# Patient Record
Sex: MALE | Race: WHITE | NOT HISPANIC OR LATINO | Employment: OTHER | URBAN - METROPOLITAN AREA
[De-identification: names, ages, dates, MRNs, and addresses within clinical notes are randomized per-mention and may not be internally consistent; named-entity substitution may affect disease eponyms.]

---

## 2018-06-27 ENCOUNTER — NURSE TRIAGE (OUTPATIENT)
Dept: ADMINISTRATIVE | Facility: CLINIC | Age: 68
End: 2018-06-27

## 2018-06-27 NOTE — TELEPHONE ENCOUNTER
Reason for Disposition   Information only question and nurse able to answer    Protocols used: ST NO PROTOCOL CALL: INFORMATION ONLY-A-[place]  wife called asking about donating a kidney to a friend. Advised that she would need to speak to the transplant center during regular business hours for more information

## 2018-10-17 ENCOUNTER — TELEPHONE (OUTPATIENT)
Dept: TRANSPLANT | Facility: CLINIC | Age: 68
End: 2018-10-17

## 2018-10-17 NOTE — TELEPHONE ENCOUNTER
Minesh Eisenberg called and stated that he is interested in becoming a living donor for his friend, George Oh.  Medical and social history obtained.  No contraindications noted.  All questions answered.  Education book emailed to patient. Instructed to contact me with any questions or if he would like to be tested. Informed that we will not begin testing until the recipient has been approved. Patient verbalized understanding.

## 2019-01-29 ENCOUNTER — TELEPHONE (OUTPATIENT)
Dept: TRANSPLANT | Facility: CLINIC | Age: 69
End: 2019-01-29

## 2019-01-29 DIAGNOSIS — Z00.5 TRANSPLANT DONOR EVALUATION: Primary | ICD-10-CM

## 2019-01-29 NOTE — TELEPHONE ENCOUNTER
Spoke with patient's wife, patient would like to be tested as a donor for his friend. Patient will be in [place] in a few weeks. Crossmatch and 24 hour urine collection scheduled 2/25/19.   unsure

## 2019-02-25 ENCOUNTER — LAB VISIT (OUTPATIENT)
Dept: LAB | Facility: HOSPITAL | Age: 69
End: 2019-02-25
Attending: FAMILY MEDICINE
Payer: MEDICARE

## 2019-02-25 DIAGNOSIS — Z00.5 TRANSPLANT DONOR EVALUATION: ICD-10-CM

## 2019-02-25 LAB
A1 AG RBC QL: NORMAL
ABO + RH BLD: NORMAL
CREAT SERPL-MCNC: 0.8 MG/DL
EST. GFR  (AFRICAN AMERICAN): >60 ML/MIN/1.73 M^2
EST. GFR  (NON AFRICAN AMERICAN): >60 ML/MIN/1.73 M^2

## 2019-02-25 PROCEDURE — 86905 BLOOD TYPING RBC ANTIGENS: CPT | Mod: TXP

## 2019-02-25 PROCEDURE — 81373 HLA I TYPING 1 LOCUS LR: CPT | Mod: 91,TXP

## 2019-02-25 PROCEDURE — 82565 ASSAY OF CREATININE: CPT | Mod: TXP

## 2019-02-25 PROCEDURE — 81376 HLA II TYPING 1 LOCUS LR: CPT | Mod: 91,TXP

## 2019-02-25 PROCEDURE — 81373 HLA I TYPING 1 LOCUS LR: CPT | Mod: TXP

## 2019-02-25 PROCEDURE — 36415 COLL VENOUS BLD VENIPUNCTURE: CPT | Mod: PO,TXP

## 2019-02-25 PROCEDURE — 81376 HLA II TYPING 1 LOCUS LR: CPT | Mod: TXP

## 2019-02-25 PROCEDURE — 86901 BLOOD TYPING SEROLOGIC RH(D): CPT | Mod: TXP

## 2019-03-04 ENCOUNTER — TELEPHONE (OUTPATIENT)
Dept: TRANSPLANT | Facility: CLINIC | Age: 69
End: 2019-03-04

## 2019-03-04 LAB — HLATY INTERPRETATION: NORMAL

## 2019-03-04 NOTE — TELEPHONE ENCOUNTER
Patient calling for test results. Results of 24 hour urine collection reviewed with Dr. Haile. Crcl acceptable and no additional testing needed.     Patient and wife notified that the results of the crossmatch with Mr. Oh show that they are compatible and kidney function is acceptable. Patient states he would like to proceed with the medical evaluation. Required testing discussed. [employer] application emailed to patient to complete. He will have the results of his recent colonoscopy and dermatology appointments sent to me. Will schedule testing once [employer] application completed. All questions were answered and patient verbalized understanding.

## 2019-03-07 LAB
HLA DRB4 1: NORMAL
HLA SSO DNA TYPING CLASS I & II INTERPRETATION: NORMAL
HLA-A 1 SERO. EQUIV: 2
HLA-A 1: NORMAL
HLA-A 2 SERO. EQUIV: 24
HLA-A 2: NORMAL
HLA-B 1 SERO. EQUIV: 35
HLA-B 1: NORMAL
HLA-B 2 SERO. EQUIV: 44
HLA-B 2: NORMAL
HLA-BW 1 SERO. EQUIV: 6
HLA-BW 2 SERO. EQUIV: 4
HLA-C 1: NORMAL
HLA-C 2: NORMAL
HLA-CW 1 SERO. EQUIV: 4
HLA-CW 2 SERO. EQUIV: 5
HLA-DQ 1 SERO. EQUIV: 7
HLA-DQ 2 SERO. EQUIV: 8
HLA-DQB1 1: NORMAL
HLA-DQB1 2: NORMAL
HLA-DRB1 1 SERO. EQUIV: 4
HLA-DRB1 1: NORMAL
HLA-DRB1 2 SERO. EQUIV: 11
HLA-DRB1 2: NORMAL
HLA-DRB3 1: NORMAL
HLA-DRB3 2: NORMAL
HLA-DRB345 1 SERO. EQUIV: 52
HLA-DRB345 2 SERO. EQUIV: 53
HLA-DRB4 2: NORMAL
HLA-DRB5 1: NORMAL
HLA-DRB5 2: NORMAL
SSDQB TESTING DATE: NORMAL
SSDRB TESTING DATE: NORMAL
SSOA TESTING DATE: NORMAL
SSOB TESTING DATE: NORMAL
SSOC TESTING DATE: NORMAL
SSODR TESTING DATE: NORMAL
TYSSO TESTING DATE: NORMAL

## 2019-03-21 ENCOUNTER — TELEPHONE (OUTPATIENT)
Dept: TRANSPLANT | Facility: CLINIC | Age: 69
End: 2019-03-21

## 2019-03-21 DIAGNOSIS — Z00.5 TRANSPLANT DONOR EVALUATION: Primary | ICD-10-CM

## 2019-03-21 NOTE — TELEPHONE ENCOUNTER
Patient's wife called, states she will be mailing completed [employer] applications to us. Address provided.   States they would like to come in the summer to complete the medical evaluation. Required testing discussed and information provided to schedule appointments. All questions were answered and patient's wife verbalized understanding.

## 2019-04-15 ENCOUNTER — TELEPHONE (OUTPATIENT)
Dept: TRANSPLANT | Facility: CLINIC | Age: 69
End: 2019-04-15

## 2019-04-15 NOTE — TELEPHONE ENCOUNTER
Patient's wife notified that NLDA application received today and will be given to the social workers to submit. Informed that it usually takes a few weeks to hear if approved after submission. Patient will update me regarding approval. All questions were answered.    ----- Message from Samanta Abreu sent at 4/15/2019  1:27 PM CDT -----  Contact: Sanam bradley's wife  Needs Advice    Reason for call: Calling to speak with pt's coordinator    Would like an update on the financial help         Communication Preference: [phone] or [phone]    Additional Information: N/A

## 2019-04-16 ENCOUNTER — SOCIAL WORK (OUTPATIENT)
Dept: TRANSPLANT | Facility: HOSPITAL | Age: 69
End: 2019-04-16

## 2019-08-12 ENCOUNTER — HOSPITAL ENCOUNTER (OUTPATIENT)
Dept: RADIOLOGY | Facility: HOSPITAL | Age: 69
Discharge: HOME | End: 2019-08-12
Attending: NURSE PRACTITIONER
Payer: MEDICARE

## 2019-08-12 ENCOUNTER — HOSPITAL ENCOUNTER (OUTPATIENT)
Dept: CARDIOLOGY | Facility: CLINIC | Age: 69
Discharge: HOME | End: 2019-08-12
Attending: NURSE PRACTITIONER
Payer: MEDICARE

## 2019-08-12 VITALS — BODY MASS INDEX: 28.63 KG/M2 | HEIGHT: 70 IN | WEIGHT: 200 LBS

## 2019-08-12 DIAGNOSIS — Z00.5 TRANSPLANT DONOR EVALUATION: ICD-10-CM

## 2019-08-12 LAB
ASCENDING AORTA: 3.44 CM
BSA FOR ECHO PROCEDURE: 2.12 M2
CV ECHO LV RWT: 0.3 CM
CV STRESS BASE HR: 56 BPM
DIASTOLIC BLOOD PRESSURE: 88 MMHG
DOP CALC LVOT AREA: 4.2 CM2
DOP CALC LVOT DIAMETER: 2.32 CM
DOP CALC LVOT PEAK VEL: 0.91 M/S
DOP CALC LVOT STROKE VOLUME: 97.73 CM3
DOP CALCLVOT PEAK VEL VTI: 23.13 CM
E WAVE DECELERATION TIME: 392.18 MSEC
E/A RATIO: 0.52
E/E' RATIO: 9 M/S
ECHO LV POSTERIOR WALL: 0.78 CM (ref 0.6–1.1)
FRACTIONAL SHORTENING: 34 % (ref 28–44)
INTERVENTRICULAR SEPTUM: 0.84 CM (ref 0.6–1.1)
IVRT: 0.16 MSEC
LA MAJOR: 6.41 CM
LA MINOR: 5.87 CM
LA WIDTH: 4.37 CM
LEFT ATRIUM SIZE: 4.16 CM
LEFT ATRIUM VOLUME INDEX: 45.4 ML/M2
LEFT ATRIUM VOLUME: 94.69 CM3
LEFT INTERNAL DIMENSION IN SYSTOLE: 3.45 CM (ref 2.1–4)
LEFT VENTRICLE DIASTOLIC VOLUME INDEX: 62.4 ML/M2
LEFT VENTRICLE DIASTOLIC VOLUME: 130.25 ML
LEFT VENTRICLE MASS INDEX: 71 G/M2
LEFT VENTRICLE SYSTOLIC VOLUME INDEX: 23.5 ML/M2
LEFT VENTRICLE SYSTOLIC VOLUME: 49.07 ML
LEFT VENTRICULAR INTERNAL DIMENSION IN DIASTOLE: 5.21 CM (ref 3.5–6)
LEFT VENTRICULAR MASS: 148.02 G
LV LATERAL E/E' RATIO: 9 M/S
LV SEPTAL E/E' RATIO: 9 M/S
MV PEAK A VEL: 0.86 M/S
MV PEAK E VEL: 0.45 M/S
OHS CV CPX 1 MINUTE RECOVERY HEART RATE: 69 BPM
OHS CV CPX 85 PERCENT MAX PREDICTED HEART RATE MALE: 127
OHS CV CPX ESTIMATED METS: 11
OHS CV CPX MAX PREDICTED HEART RATE: 149
OHS CV CPX PATIENT IS FEMALE: 0
OHS CV CPX PATIENT IS MALE: 1
OHS CV CPX PEAK DIASTOLIC BLOOD PRESSURE: 66 MMHG
OHS CV CPX PEAK HEAR RATE: 101 BPM
OHS CV CPX PEAK RATE PRESSURE PRODUCT: NORMAL
OHS CV CPX PEAK SYSTOLIC BLOOD PRESSURE: 202 MMHG
OHS CV CPX PERCENT MAX PREDICTED HEART RATE ACHIEVED: 68
OHS CV CPX RATE PRESSURE PRODUCT PRESENTING: 8568
PISA TR MAX VEL: 1.75 M/S
PULM VEIN S/D RATIO: 1.41
PV PEAK D VEL: 0.34 M/S
PV PEAK S VEL: 0.48 M/S
RA MAJOR: 5.52 CM
RA WIDTH: 4.73 CM
RIGHT VENTRICULAR END-DIASTOLIC DIMENSION: 4.5 CM
RV TISSUE DOPPLER FREE WALL SYSTOLIC VELOCITY 1 (APICAL 4 CHAMBER VIEW): 11.94 CM/S
SINUS: 3.75 CM
STJ: 2.95 CM
STRESS ECHO POST EXERCISE DUR MIN: 6 MINUTES
STRESS ECHO POST EXERCISE DUR SEC: 42 SECONDS
SYSTOLIC BLOOD PRESSURE: 153 MMHG
TDI LATERAL: 0.05 M/S
TDI SEPTAL: 0.05 M/S
TDI: 0.05 M/S
TR MAX PG: 12 MMHG
TRICUSPID ANNULAR PLANE SYSTOLIC EXCURSION: 2.24 CM

## 2019-08-12 PROCEDURE — 71046 X-RAY EXAM CHEST 2 VIEWS: CPT | Mod: TC,FY,TXP

## 2019-08-12 PROCEDURE — 93351 STRESS TTE COMPLETE: CPT | Mod: PBBFAC,NTX | Performed by: INTERNAL MEDICINE

## 2019-08-12 PROCEDURE — 71046 X-RAY EXAM CHEST 2 VIEWS: CPT | Mod: 26,TXP,, | Performed by: RADIOLOGY

## 2019-08-12 PROCEDURE — 93351 ECHOCARDIOGRAM STRESS TEST (CUPID ONLY): ICD-10-PCS | Mod: S$GLB,,, | Performed by: INTERNAL MEDICINE

## 2019-08-12 PROCEDURE — 71046 XR CHEST PA AND LATERAL: ICD-10-PCS | Mod: 26,TXP,, | Performed by: RADIOLOGY

## 2019-08-13 ENCOUNTER — HOSPITAL ENCOUNTER (OUTPATIENT)
Dept: RADIOLOGY | Facility: HOSPITAL | Age: 69
Discharge: HOME | End: 2019-08-13
Attending: TRANSPLANT SURGERY
Payer: MEDICARE

## 2019-08-13 ENCOUNTER — OFFICE VISIT (OUTPATIENT)
Dept: PSYCHIATRY | Facility: CLINIC | Age: 69
End: 2019-08-13
Payer: MEDICARE

## 2019-08-13 ENCOUNTER — OFFICE VISIT (OUTPATIENT)
Dept: TRANSPLANT | Facility: CLINIC | Age: 69
End: 2019-08-13
Payer: MEDICARE

## 2019-08-13 VITALS
DIASTOLIC BLOOD PRESSURE: 84 MMHG | WEIGHT: 208.13 LBS | HEART RATE: 52 BPM | RESPIRATION RATE: 18 BRPM | BODY MASS INDEX: 30.83 KG/M2 | OXYGEN SATURATION: 96 % | SYSTOLIC BLOOD PRESSURE: 163 MMHG | TEMPERATURE: 98 F | HEIGHT: 69 IN

## 2019-08-13 DIAGNOSIS — Z00.5 TRANSPLANT DONOR EVALUATION: Primary | ICD-10-CM

## 2019-08-13 DIAGNOSIS — Z00.5 TRANSPLANT DONOR EVALUATION: ICD-10-CM

## 2019-08-13 DIAGNOSIS — Z87.09 HISTORY OF ASTHMA: ICD-10-CM

## 2019-08-13 DIAGNOSIS — Z01.818 PRE-OP EXAM: Primary | ICD-10-CM

## 2019-08-13 DIAGNOSIS — Z52.4 KIDNEY DONOR: ICD-10-CM

## 2019-08-13 DIAGNOSIS — R03.0 ELEVATED BLOOD PRESSURE READING: ICD-10-CM

## 2019-08-13 DIAGNOSIS — E78.49 OTHER HYPERLIPIDEMIA: ICD-10-CM

## 2019-08-13 DIAGNOSIS — K21.9 GASTROESOPHAGEAL REFLUX DISEASE WITHOUT ESOPHAGITIS: ICD-10-CM

## 2019-08-13 DIAGNOSIS — Z85.828 HISTORY OF SCC (SQUAMOUS CELL CARCINOMA) OF SKIN: ICD-10-CM

## 2019-08-13 PROCEDURE — 99999 PR PBB SHADOW E&M-EST. PATIENT-LVL III: ICD-10-PCS | Mod: PBBFAC,TXP,,

## 2019-08-13 PROCEDURE — 74177 CT ABD & PELVIS W/CONTRAST: CPT | Mod: 26,TXP,, | Performed by: RADIOLOGY

## 2019-08-13 PROCEDURE — 99205 PR OFFICE/OUTPT VISIT, NEW, LEVL V, 60-74 MIN: ICD-10-PCS | Mod: S$GLB,TXP,, | Performed by: NURSE PRACTITIONER

## 2019-08-13 PROCEDURE — 99244 OFF/OP CNSLTJ NEW/EST MOD 40: CPT | Mod: S$GLB,TXP,, | Performed by: SURGERY

## 2019-08-13 PROCEDURE — 97802 MEDICAL NUTRITION INDIV IN: CPT | Mod: S$GLB,TXP,, | Performed by: DIETITIAN, REGISTERED

## 2019-08-13 PROCEDURE — 74177 CT ABD & PELVIS W/CONTRAST: CPT | Mod: TC,TXP

## 2019-08-13 PROCEDURE — 99244 PR OFFICE CONSULTATION,LEVEL IV: ICD-10-PCS | Mod: S$GLB,TXP,, | Performed by: SURGERY

## 2019-08-13 PROCEDURE — 96130 PR PSYCHOLOGIC TEST EVAL SVCS, 1ST HR: ICD-10-PCS | Mod: S$GLB,ICN,, | Performed by: PSYCHOLOGIST

## 2019-08-13 PROCEDURE — 96131 PSYCL TST EVAL PHYS/QHP EA: CPT | Mod: S$GLB,ICN,, | Performed by: PSYCHOLOGIST

## 2019-08-13 PROCEDURE — 96131 PR PSYCHOLOGIC TEST EVAL SVCS, EA ADDTL HR: ICD-10-PCS | Mod: S$GLB,ICN,, | Performed by: PSYCHOLOGIST

## 2019-08-13 PROCEDURE — 96138 PSYCL/NRPSYC TECH 1ST: CPT | Mod: 59,S$GLB,ICN, | Performed by: PSYCHOLOGIST

## 2019-08-13 PROCEDURE — 96139 PSYCL/NRPSYC TST TECH EA: CPT | Mod: S$GLB,ICN,, | Performed by: PSYCHOLOGIST

## 2019-08-13 PROCEDURE — 99999 PR PBB SHADOW E&M-EST. PATIENT-LVL III: CPT | Mod: PBBFAC,TXP,,

## 2019-08-13 PROCEDURE — 90791 PR PSYCHIATRIC DIAGNOSTIC EVALUATION: ICD-10-PCS | Mod: S$GLB,ICN,, | Performed by: PSYCHOLOGIST

## 2019-08-13 PROCEDURE — 74177 CT ABDOMEN PELVIS WITH CONTRAST: ICD-10-PCS | Mod: 26,TXP,, | Performed by: RADIOLOGY

## 2019-08-13 PROCEDURE — 97802 PR MED NUTR THER, 1ST, INDIV, EA 15 MIN: ICD-10-PCS | Mod: S$GLB,TXP,, | Performed by: DIETITIAN, REGISTERED

## 2019-08-13 PROCEDURE — 96138 PR PSYCH/NEUROPSYCH TEST ADMIN/SCORING, BY TECH, 2+ TESTS, 1ST 30 MIN: ICD-10-PCS | Mod: 59,S$GLB,ICN, | Performed by: PSYCHOLOGIST

## 2019-08-13 PROCEDURE — 90791 PSYCH DIAGNOSTIC EVALUATION: CPT | Mod: S$GLB,ICN,, | Performed by: PSYCHOLOGIST

## 2019-08-13 PROCEDURE — 96130 PSYCL TST EVAL PHYS/QHP 1ST: CPT | Mod: S$GLB,ICN,, | Performed by: PSYCHOLOGIST

## 2019-08-13 PROCEDURE — 25500020 PHARM REV CODE 255: Mod: TXP | Performed by: TRANSPLANT SURGERY

## 2019-08-13 PROCEDURE — 99205 OFFICE O/P NEW HI 60 MIN: CPT | Mod: S$GLB,TXP,, | Performed by: NURSE PRACTITIONER

## 2019-08-13 PROCEDURE — 96139 PR PSYCH/NEUROPSYCH TEST ADMIN/SCORING, BY TECH, 2+ TESTS, EA ADDTL 30 MIN: ICD-10-PCS | Mod: S$GLB,ICN,, | Performed by: PSYCHOLOGIST

## 2019-08-13 RX ORDER — CYANOCOBALAMIN (VITAMIN B-12) 500 MCG
400 TABLET ORAL DAILY
COMMUNITY

## 2019-08-13 RX ORDER — OMEPRAZOLE 20 MG/1
20 CAPSULE, DELAYED RELEASE ORAL DAILY
COMMUNITY

## 2019-08-13 RX ORDER — TRAMADOL HYDROCHLORIDE 50 MG/1
50 TABLET ORAL EVERY 8 HOURS PRN
COMMUNITY

## 2019-08-13 RX ORDER — ASPIRIN 81 MG/1
81 TABLET ORAL DAILY
COMMUNITY

## 2019-08-13 RX ORDER — CELECOXIB 200 MG/1
200 CAPSULE ORAL DAILY
COMMUNITY

## 2019-08-13 RX ORDER — EZETIMIBE 10 MG/1
10 TABLET ORAL DAILY
COMMUNITY

## 2019-08-13 RX ADMIN — IOHEXOL 125 ML: 350 INJECTION, SOLUTION INTRAVENOUS at 05:08

## 2019-08-13 NOTE — PROGRESS NOTES
PHARM.D. PRE-TRANSPLANT DONOR NOTE:    This patient's medication therapy was evaluated as part of his pre-transplant evaluation.      The following general pharmacologic concerns were noted: aspirin (recommend to hold 5-7 days prior to surgery).  Patient also takes celebrex (NSAIDs) daily - discussed that we recommend to avoid NSAIDs post donation, patient and caregiver aware and may try alternatives (APAP) prior to donation.        Current Outpatient Medications   Medication Sig Dispense Refill    aspirin (ECOTRIN) 81 MG EC tablet Take 81 mg by mouth once daily.      celecoxib (CELEBREX) 200 MG capsule Take 200 mg by mouth once daily.      cholecalciferol, vitamin D3, (VITAMIN D3) 400 unit Tab Take 400 Units by mouth once daily.      ezetimibe (ZETIA) 10 mg tablet Take 10 mg by mouth once daily.      omeprazole (PRILOSEC) 20 MG capsule Take 20 mg by mouth once daily.      traMADol (ULTRAM) 50 mg tablet Take 50 mg by mouth every 8 (eight) hours as needed for Pain.       No current facility-administered medications for this visit.          Currently Mr Eisenberg is responsible for preparing / administering this patient's medications on a daily basis.  I am available for consultation and can be contacted, as needed by the other members of the Kidney Transplant team.

## 2019-08-13 NOTE — PROGRESS NOTES
REFERRING PROVIDER: Gabrielle De La Garza MD    REASON FOR VISIT: High triglyceride education, weight loss education for potential kidney donor    PAST MEDICAL HX: Hyperlipidemia    LABS:     WEIGHT/BMI: 208 lb / 31.2      NUTRITION HX: Pt states that he has a family history of high TG and that he has been prescribed ezetimibe for this. Drinks various sugar-sweetened beverages, uses honey throughout the day. States his weight is normally 195-200 lb but that it has been increasing somewhat recently. States he is active with riding and taking care of horses. Has been trying to make it to the gym but has had a lot going on.      INTERVENTION/EDUCATION: Reviewed Nutrition Therapy for High Triglycerides Packet (tips for lowering triglycerides, foods recommended, foods not recommended, meal planning tips, sample menu). Weight loss packet provided and reviewed (plate method for portion/calorie control, weight loss tips, weight management cooking tips, sample menu, exercise recommendations). Provided pt with weight goal of 195lb for BMI <30 and daily calorie goal of 1800.      Patient voiced understanding of education & goals. Contact information was provided & will f/u as needed at clinic visits.     Consultation Time: 15 minutes

## 2019-08-13 NOTE — PROGRESS NOTES
"DONOR TEACHING NOTE    Met with Minesh Eisenberg today in clinic to review living donor education.        Topics covered included:  · Kidney donation is done voluntarily and of the donor's free will.  Process can stop at any time.   · Better success rates than cadaveric donation, shorter waiting time for the recipient: less than the 3-5 year wait on the list, more time to prepare: tests/surgery can be planned  · Laboratory studies of blood and urine.  Health exams: records of GYN/pap/mammogram (females); evaluation by transplant team and a psychiatrist (if indicated); diagnostic Tests: CXR, EKG, TB skin test, 24-hour urine collection, renal scan, CT of abdomen to assess kidney anatomy, and stress test (if indicated)  · Team will review workup for approval.  Copy of the approved criteria for donation given to patient.  Surgeon will decide which kidney will be donated.   · Benefits of laparoscopic nephrectomy: less pain, shorter hospital stay, a shorter recovery period.  Risks discussed: bleeding, deep vein thrombosis, pulmonary embolus, the need for re-operation.  Your operation may be converted to an "open" procedure if the surgeon feels it is medically necessary.  · To recovery room, transfer to TSU, if space allows or semi-private room.  Hall catheter/IV, average hospital stay is 1 day.  At discharge the cost of any prescriptions is the donor's responsibility.  · Post-operative visit 4 weeks after surgery or prior to returning to work, unless a complication arises and you need to be seen sooner.  Off of work for about 3 to 6 weeks, no driving for at least the first 3 weeks.  General surgical complications: infection, allergic reaction, and anesthesia.   · Long life considerations of living with one kidney: risk of HTN and kidney failure   · Following up with PCP 6 months after donation then yearly thereafter to monitor kidney function.  This should include weight, labs, urinalysis, and a blood pressure check.  We are " required to report your progress to [facility] at 6 months, 1 year, and 2 years post-donation.     Written educational material provided. Informed consent reviewed and signed. All questions were answered and patient verbalized understanding.     Patient is a suitable candidate for living kidney donation.    Mr. Eisenberg given copies of all test results. Instructed him to follow up with a local cardiologist due to positive stress test. Both patient and spouse verbalized understanding and will have progress notes, additional testing, risk stratification and clearance for kidney donation surgery. All questions answered.

## 2019-08-13 NOTE — PROGRESS NOTES
TRANSPLANT DONOR PSYCHOSOCIAL ASSESSMENT    Minesh Eisenberg  [address]  [place] [address]  Telephone Information:   Mobile [phone]     Home [phone] (home)  Work  There is no work phone number on file.  E-mail  [email or web address]    PHS Increased Risk Behavioral Questionnaire reviewed by : Yes   addressed any PHS increased risk behaviors or concerns. Resources and education provided as appropriate.    Sex: male  YOB: 1950  Age: 69 y.o.    Encounter Date: 2019  [place] Citizen: yes  Primary Language: English   Needed: no    Potential Recipient: George Oh  [facility] Number: [id number]  Donor's Relationship to Patient: friend    Emergency Contact:  Name: Sanam Eisenberg  Relationship: wife  Address: same as patient  Phone Numbers:  [phone] (home), n/a (work), [phone] (mobile)    Family/Social Support:   Number of dependents/: none  Marital history:  twice,  once and  for 45 years to current wife.  3 adult children.  Other family dynamics: mother is living and in her 90s living with pt and wife; father .  One brother.      Household Composition:  Name: Joshua Eisenberg  Age: 69 and 65  Relationship: patient and wife  Does person drive? yes    Relationship:  Pt's  mother  Does person drive? no    Do you and your caregivers have access to reliable transportation? yes  PRIMARY CAREGIVER: Sanam Eisenberg will be primary caregiver, phone number [phone] .      provided in-depth information to patient and caregiver regarding pre- and post-transplant caregiver role.   strongly encourages patient and caregiver to have concrete plan regarding post-transplant care giving, including back-up caregiver(s) to ensure care giving needs are met as needed.    Patient and Caregiver states understanding all aspects of caregiver role/commitment and is able/willing/committed to being caregiver to the fullest  extent necessary.    Patient and Caregiver verbalizes understanding of the education provided today and caregiver responsibilities.         remains available. Patient and Caregiver agree to contact  in a timely manner if concerns arise.      Able to take time off work without financial concerns: yes.     Additional Significant Others who will Assist with Transplant:  Name: Divya Dickey [phone]  Age: 40  City: [place] State: [place]  Relationship: daughter  Does person drive? yes    Living Will: no  Healthcare Power of : no  Advance Directives on file: <no information> per medical record.  Verbally reviewed LW/HCPA information.   provided patient with copy of LW/HCPA documents and provided education on completion of forms.    Education: high school  Reading Ability: 12th grade  Reports difficulty with: hearing and N/A; wears glasses and bilateral hearing aids.  Learns [employer]:  Hands on     Status: yes: [employer], date: 68-69  VA Benefits: states unsure if eligible for benefits.  Encouraged to investigate.     Employment:  Retired  Fundraising and NLD[employer] information provided to patient.  Patient and Caregiver verbalizes understanding.   remains available.    Spouse/Significant Other Employment: Nurse Practitioner part time, flexible schedule    Insurance: see potential recipient's insurance for donor coverage.    Does the donor have health insurance? Yes    Patient and Caregiver verbalizes clear understanding that patient may experience difficulty obtaining and/or be denied insurance coverages post-surgery.  This includes and is not limited to disability insurance, life insurance, health insurance, burial insurance, long term care insurance, and other insurances.  Patient also reports understanding that future health concerns related to or unrelated to transplantation may not be covered by patient's insurance.  Resources and information provided  and reviewed.      Patient provides verbal permission to release any necessary information to outside resources for patient care and discharge planning.  Resources and information provided and reviewed.      Infusion Service: patient utilizing? no  Home Health: patient utilizing? no  DME: hearing aids  ADLS:  Pt states he is independent with ADLs.    Adherence:   Pt states current and expected compliance with all healthcare recommendations..   Adherence education and counseling provided    Per History Section:  Past Medical History:   Diagnosis Date    Arthritis     Esophageal stricture     GERD (gastroesophageal reflux disease)     Hyperlipemia     Osteoarthritis     Osteopenia     Peripheral neuropathy     Squamous cell cancer of skin of cheek      Social History     Tobacco Use    Smoking status: Never Smoker    Smokeless tobacco: Never Used   Substance Use Topics    Alcohol use: Yes     Frequency: 2-3 times a week     Drinks per session: 1 or 2     Social History     Substance and Sexual Activity   Drug Use No     Social History     Substance and Sexual Activity   Sexual Activity Not on file       Per Today's Psychosocial:  Tobacco: Smoked for 8 years and quit in 1973..  Alcohol: approx 2 drinks a week..  Illicit Drugs/Non-prescribed Medications: none, patient denies any use.    Patient and Caregiver states clear understanding of the potential impact of substance use as it relates to donor candidacy and is agreeable to random substance screening.  Substance abstinence/cessation counseling, education and resources provided and reviewed.     Arrests/DWI/Treatment/Rehab: patient denies    Psychiatric History:    Mental Health: Pt denies mental health concerns  Psychiatrist/Counselor: none  Medications:  none  Suicide/Homicide Issues: Pt denies current or past suicidal/homicidal ideation.   Safety at home: Pt denies any home safety concerns.    Donation Knowledge/Expectations: Patient and Caregiver states  "having clear understanding and realistic expectations regarding the potential risks and potential benefits of organ transplantation and organ donation and agrees to discuss with health care team members and support system members, as well as to utilize available resources and express questions and/or concerns in order to further facilitate the patients informed decision-making.  Resources and information provided and reviewed. Pt states his wife is "against it" regarding donation.  He states, "My son is not too crazy about it either."  Wife privately informed sw that she supports pt's decision, whatever it turns out to be.     Decision-making Process: Pt states he wishes to donate a kidney to his "best friend since high school".  They have remained in touch throughout the years and were in the same line of work.  They would spend weekends at his friends second home, engaging in outdoor activities with their children.  Pt stated friend started dialysis approx 3 yrs and and had been doing home dialysis for about a year.  "He's not happy."  Pt stated about friend's current situation.  Pt stated when he found out that he could possibly donate an organ and help improve friend's quality of life, he decided he wanted to help him.    Patient states understanding that transplant and donation are not a guarantee that the donated organ will function.  Pt also verbalized understanding that pt could possibly lose the donated organ by virtue of noncompliance.  Pt stated, "Of course, I would be disappointed, but we would still be friends."   Patient states understanding of kidney treatment options available for kidney patients, psychosocial aspects surrounding organ donation and transplantation as well as recovery.  Patient also states clear understanding that patient may choose to not donate at any time prior to surgery taking place, and that patient confidentiality is protected.  Patient reports expected compliance with health " "care regime and states understanding of importance of compliance.  Educational information provided.    Patient reports having a clear understanding  that risks and benefits may be involved with organ transplantation and with organ donation and  of the treatment options available to a potential transplant recipient. Patient reports clear understanding that psychosocial risk factors which may affect patient, including but not limited to feelings of depression, generalized anxiety, anxiety regarding dependence on others, post traumatic stress disorder, feelings of guilt and other emotional and/or mental concerns, and/or exacerbation of existing mental health concerns.     Detailed resources and education provided and discussed. Patient agrees to access appropriate resources in a timely manner as needed and to communicate concerns appropriately.      Feelings or Concerns: "This is all my idea", stated pt. Patient denies feelings of coercion, pressure or obligation to donate.  Pt stated he is not putting any undue pressure upon himself to donate.  Patient states that patient is not receiving any compensation for organ donation. Patient reports motivation to pursue organ donation at this time.     Patient and Caregiver reports having clear and realistic expectations and understanding of the many psychosocial aspects involved with being a living organ donor, including but not limited to costs, compliance, medications, lab work, procedures, appointments, financial planning, preparedness, timely and appropriate communication of concerns, abstinence from non-prescribed drugs or substances, importance of adherence to and follow-through with all health care team recommendations, participation in health care and treatment planning, utilization of resources and follow-through, mental health counseling as needed and/or recommended, and the patient and caregiver responsibilities.  Patient and Caregiver states having a clear " understanding of possible difficulty obtaining or possibility of being denied insurance coverage post-surgery.  This includes and is not limited to disability insurance, life insurance, health insurance, burial insurance, long-term care insurance and other insurances.  Educational and resource information provided and reviewed.  Patient and Caregiver also reports understanding that future health concerns related to or unrelated to organ donation may not be covered by patients insurance.    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    Interview Behavior: Minesh presents as alert and oriented x 4, pleasant, good eye contact, well groomed, recall good, concentration/judgement good, average intelligence, calm, communicative, cooperative and asking and answering questions appropriately.  Pt was accompanied by his wife for the first half of the visit.  Wife answered and asked appropriate question and stated support of pt's pursuit to donate.    Suitability for Donation: Minesh Eisenberg presents as a suitable candidate for donation at this time.    Recommendations/Additional Comments: Recommend pt contact transplant team at any time during the process.  Written and verbal info re NLDAC provided.

## 2019-08-13 NOTE — PROGRESS NOTES
Kidney Transplant Donor Evaluation    Subjective:       CC:  Initial evaluation of kidney donor candidacy.    HPI:  Mr. Eisenberg is a 69 y.o. year old White male who has presented to be evaluated as a potential living unrelated donor for friend.  Mr. Eisenberg reports being here without coercion, payment, guilt or other alternative motives other than wanting to help someone with kidney disease.    Patient denies any history of coronary artery disease, stroke, seizure disorder, chronic obstructive pulmonary disease, liver disease, kidney stones, gallstones, deep venous thrombosis, pulmonary embolism, recurrent urinary tract infections or malignancies.      GERD s/p Nissen Fundoplication OVER 8 YEARS AGO. DOING WELL, NO PROBLEMS REPORTED.     HX SCC last skin cancer excision , HX seborrheic keratosis   Last lesion/excision ~ 2 years ago  He f/u with dermatology       Back surgery  Celebrex daily but willing to not take to donate    2/4/2019  PCP, Dr Gamino (care very where)    Was seen for bilateral peripheral neuropathy in the feet--prescribed gabapentin  --pt never took prescription  States he has been having the neuropathy in his feet ~ 1 year before having back problems ~ 2009. Peripheral neuropathy has worsened. Also reports right foot drop after back surgery.   BP Readings from Last 3 Encounters: noted in office visit>   02/04/19 114/78   01/31/19 126/70   11/13/18 (!) 153/80       Pt does not work out but [place] , taking care of the property and horses.     8/12/2019  ECHOCARDIOGRAM EXERCISE STRESS TEST   Conclusion     · The test was stopped because the patient experienced fatigue. The test was stopped secondary to fatigue  · Arrhythmias during stress: occasional PVCs,  · Overall, the patient's exercise capacity was average.  · The EKG portion of this study is negative for myocardial ischemia.  · Normal left ventricular systolic function. The estimated ejection fraction is 58%  · Local segmental wall  motion abnormalities.  · Septal wall has abnormal motion.  · Indeterminate left ventricular diastolic function.  · Moderate left atrial enlargement.  · Normal right ventricular systolic function.  · Mild right ventricular enlargement.  · Moderate right atrial enlargement.  · The stress echo portion of this study is positive for myocardial ischemia in a small area of the distal soo-septum.        Care every where             Past Medical History:   Diagnosis Date    GERD (gastroesophageal reflux disease)     Hyperlipemia     Osteopenia     Peripheral neuropathy     Squamous cell cancer of skin of cheek      Lab Results   Component Value Date    CREATININE 0.9 08/12/2019    BUN 21 08/12/2019     08/12/2019    K 4.0 08/12/2019     08/12/2019    CO2 23 08/12/2019     Lab Results   Component Value Date    ALT 25 08/12/2019    AST 18 08/12/2019    ALKPHOS 69 08/12/2019    BILITOT 0.4 08/12/2019     Lab Results   Component Value Date    WBC 7.59 08/12/2019    HGB 16.2 08/12/2019    HCT 48.5 08/12/2019    MCV 91 08/12/2019     08/12/2019     Lab Results   Component Value Date    HGBA1C 5.6 08/12/2019     Lab Results   Component Value Date    PSA 0.48 08/12/2019         Review of Systems   Constitutional: Negative for activity change, appetite change, chills, fatigue, fever and unexpected weight change.   HENT: Negative for congestion, facial swelling, postnasal drip, rhinorrhea, sinus pressure, sore throat and trouble swallowing.    Eyes: Positive for visual disturbance. Negative for pain and redness.        Wears glasses   Respiratory: Negative for cough, chest tightness, shortness of breath and wheezing.    Cardiovascular: Negative.  Negative for chest pain, palpitations and leg swelling.   Gastrointestinal: Negative for abdominal pain, diarrhea, nausea and vomiting.        HX GERD   Genitourinary: Negative for dysuria, flank pain and urgency.   Musculoskeletal: Positive for arthralgias and back  "pain. Negative for gait problem, neck pain and neck stiffness.   Skin: Negative for rash.        HX SCC   Allergic/Immunologic: Negative for environmental allergies, food allergies and immunocompromised state.   Neurological: Negative for dizziness, weakness, light-headedness and headaches.        Bilateral peripheral neuropathy/ feet   HX right foot drop   Psychiatric/Behavioral: Negative for agitation and confusion. The patient is not nervous/anxious.        Objective:  BP (!) 140/74 (BP Location: Right arm, Patient Position: Sitting, BP Method: Medium (Automatic))   Pulse 66   Temp 98.3 °F (36.8 °C) (Oral)   Resp 18   Ht 5' 8.5" (1.74 m)   Wt 94.4 kg (208 lb 1.8 oz)   SpO2 96%   BMI 31.18 kg/m²      Physical Exam   Constitutional: He is oriented to person, place, and time. He appears well-developed and well-nourished.   HENT:   Head: Normocephalic.   Mouth/Throat: Oropharynx is clear and moist. No oropharyngeal exudate.   Eyes: Pupils are equal, round, and reactive to light. Conjunctivae and EOM are normal. No scleral icterus.   Neck: Normal range of motion. Neck supple.   Cardiovascular: Normal rate, regular rhythm and normal heart sounds.   Pulmonary/Chest: Effort normal and breath sounds normal.   Abdominal: Soft. Normal appearance and bowel sounds are normal. He exhibits no distension and no mass. There is no splenomegaly or hepatomegaly. There is no tenderness. There is no rebound, no guarding, no CVA tenderness, no tenderness at McBurney's point and negative Johnson's sign.   Musculoskeletal: Normal range of motion. He exhibits no edema.   Lymphadenopathy:     He has no cervical adenopathy.   Neurological: He is alert and oriented to person, place, and time. He exhibits normal muscle tone. Coordination normal.   Skin: Skin is warm and dry.   Psychiatric: He has a normal mood and affect. His behavior is normal.   Vitals reviewed.      Labs:  8/12/2019: Creatinine 0.9 mg/dL (Ref range: 0.5 - 1.4 mg/dL); " Creatinine, Random Ur 74.0 mg/dL (Ref range: 23.0 - 375.0 mg/dL); BUN, Bld 21 mg/dL (Ref range: 8 - 23 mg/dL)  8/12/2019: Nitrite, UA Negative (Ref range: Negative)  ABO type: A NEG      Assessment:     1. Transplant donor evaluation    2. Elevated blood pressure reading    3. BMI 31.0-31.9,adult    4. Other hyperlipidemia    5. Gastroesophageal reflux disease without esophagitis    6. History of SCC (squamous cell carcinoma) of skin    7. History of asthma        Plan:   · Moderate right atrial enlargement.  · The stress echo portion of this study is positive for myocardial ischemia in a small area of the distal soo-septum.  Will need to discuss with committee c/o candidacy to donate      If OK to proceed will need cardiology clearance   Elevated BP-->24 hour BP monitor  NM GFR , elevated cr Clearance     Would need to avoid NSAIDS including Celebrex post nephrectomy     Body mass index is 31.18 kg/m².  Acceptable for left, if right is indicated will need to lose weight/ center guidelines       Donor Candidacy:   Based on the given information, Mr. Eisenberg appears to be a marginal to unacceptable  candidate for kidney donation.  A final recommendation will be made by the selection committee after reviewing his complete workup.    Mercedez Ba NP       Counseling:   I discussed with Mr. Eisenberg that donation is voluntary and reiterated it should be done willingly and for altruistic reasons only.  I reviewed that no payment should be received for donating.  I also discussed that coercion, guilt, pressure, or feelings of obligation are not appropriate reasons to donate.  The option to withdraw at any time was emphasized.  Mr. Eisenberg was reminded that a medical opt out can be given to protect his confidentiality, and no member of the transplant team will discuss specifics of his health or medical/social history with anyone else without permission.  The need for lifelong routine medical follow-up for optimal health,  including routine health maintenance was reviewed.    We also discussed the long term risks associated with kidney donation.  I told the patient that his GFR should return to within 75-80% of pre-donation level within six months of donation.  I informed the patient that there is a small risk of developing albuminuria and hypertension following donor nephrectomy.  I also informed the patient that based on current literature, the risk of developing end-stage renal disease following donor nephrectomy is similar to the general population.    I reviewed with Mr. Eisenberg available lab results and other diagnostics from the evaluation process    Additional Counseling:   The patient was counseled on the need for regular follow-up with a primary care physician for blood pressure and cholesterol screening.  The importance of age appropriate health screening was also emphasized.    Follow-up:    prn     Altogether, 40 minutes of this encounter were spent on counseling, which was greater than 50% of the total visit time.

## 2019-08-13 NOTE — PROGRESS NOTES
Transplant Surgery Kidney Donor Evaluation     Referring Physician:     Subjective:     Chief Complaint: Minesh Eisenberg is a 69 y.o. year old male who presents today wishing to be evaluated as a potential living unrelated donor for friend.    History of Present Illness:  Minesh reports being here without coercion, payment, guilt, or other alternative motives other than wanting to help someone with kidney disease.    Review of Systems   Constitutional: Negative for activity change, appetite change, chills and fever.   Respiratory: Negative for cough and shortness of breath.    Cardiovascular: Negative for chest pain and leg swelling.   Gastrointestinal: Negative for abdominal distention, constipation, diarrhea, nausea and vomiting.   Genitourinary: Negative for difficulty urinating and dysuria.   Skin: Negative for color change and rash.   Neurological: Negative for dizziness and light-headedness.   All other systems reviewed and are negative.      Objective:   Physical Exam   Constitutional: He is oriented to person, place, and time. Vital signs are normal. He appears well-developed and well-nourished. No distress.   HENT:   Mouth/Throat: No oropharyngeal exudate.   Eyes: Pupils are equal, round, and reactive to light. No scleral icterus.   Neck: Neck supple. No thyroid mass and no thyromegaly present.   Cardiovascular: Normal rate, normal heart sounds and intact distal pulses.   No murmur heard.  Pulmonary/Chest: Effort normal and breath sounds normal. No respiratory distress. He has no wheezes. He has no rales.   Abdominal: Soft. Bowel sounds are normal. He exhibits no distension, no ascites and no mass. There is no hepatosplenomegaly. There is no tenderness. There is no rebound and no guarding. No hernia.   Musculoskeletal: He exhibits no edema.   Lymphadenopathy:        Head (right side): No submandibular adenopathy present.        Head (left side): No submandibular adenopathy present.     He has no cervical  adenopathy.        Right: No inguinal and no supraclavicular adenopathy present.        Left: No inguinal and no supraclavicular adenopathy present.   Neurological: He is alert and oriented to person, place, and time.   Skin: Skin is dry and intact. No rash noted. He is not diaphoretic.   Psychiatric: He has a normal mood and affect.   Nursing note and vitals reviewed.    ABO type: A NEG    Diagnoses:  1. Transplant donor evaluation    2. Elevated blood pressure reading    3. BMI 31.0-31.9,adult    4. Other hyperlipidemia    5. Gastroesophageal reflux disease without esophagitis             Transplant Surgery - Candidacy   Assessment/Plan:     Donor Candidacy: Based on information available thus far, Minesh is a suitable candidate for living kidney donation.    Rich Quiroz MD       Education: I discussed with the patient the requirements for donation including the compatibility of blood and tissue typing, healthy by physical examination and workup, as well as the desire to donate.  We discussed the risks related to surgery including the risks related to anesthesia, bleeding, infection, inability for surgery to be performed laparoscopically, risks of reoperation as well as the risks of death.  We discussed the length of hospitalization, return to work times, as well as follow-up post-donation.    I also discussed the slight possibility that due to problems with the recipient operation, the transplant might not be able to be completed after the organ was already removed. If such a situation should arise, the donor prefers that the organ be transplanted into a suitable third-party recipient.    I discussed the possibility that living donor sometimes encounter problems obtaining health insurance, or could have higher premium despite ongoing efforts of transplant professionals to educate insurance companies on this issue.    I discussed with Minesh that donation is a voluntary activity, and reiterated it should be done  willingly and for altruistic reasons only.  I reviewed that no payment should be made for donating.  I also discussed that coersion, guilt, pressure, or feelings of obligation are not appropriate reasons to donate.  The option to withdraw at any time was emphasized.  Minesh was reminded that a medical opt out can be given to protect his confidentiality, and no member of the transplant team will discuss specifics of his health or medical/social history with anyone else without permission.  The need for lifelong routine medical follow-up for optimal health, including routine health maintenance was reviewed.    Additionally, I discussed the need for our program to be able to contact living donors for UNOS reporting purposes for a minimum of 2 years.  Failure of our center to be able to provide such information could jeopardize our ability to continue to offer living donor transplants.  Minesh voices understanding and agrees to this follow-up.    I discussed the UNOS requirement for centers to report donor status for a minimum of two years. Minesh understands that failure to comply with requirement could have adverse consequences for our transplant program, and agrees to cooperate with all our required follow-up.    I reviewed with Minesh available lab results and other diagnostics from the evaluation process.

## 2019-08-16 ENCOUNTER — COMMITTEE REVIEW (OUTPATIENT)
Dept: TRANSPLANT | Facility: CLINIC | Age: 69
End: 2019-08-16

## 2019-08-16 NOTE — COMMITTEE REVIEW
Minesh Eisenberg was discussed at selection committee on 8/16/19 . Patient did not meet selection criteria for living kidney donation due to abnormal heart echocardiogram and large class IIF Bosniak cyst in right kidney.    Message left for patient to call for test results.     Note written by Erin Green RN    ===============================================================  I was present at the meeting and attest to the decision of the committee.    Estevan Cam  08/16/2019

## 2019-08-17 ENCOUNTER — TELEPHONE (OUTPATIENT)
Dept: PSYCHIATRY | Facility: CLINIC | Age: 69
End: 2019-08-17

## 2019-08-17 NOTE — TELEPHONE ENCOUNTER
Report of Psychological Evaluation is completed. It can be accessed through the Chart Review activity under the Notes tab to the right of the Encounters tab).  It is titled Psych Testing.    There are no contraindications to kidney donation  from the psychological perspective. No recommendations.  Thanks. EC

## 2019-08-17 NOTE — PROGRESS NOTES
Psychiatry Initial Visit (PhD/LCSW)    Date:  8/13/2019    CPT Code: 53333    Referred by:  Mercedez Ba N.P.    Chief complaint/reason for encounter:  Psychological Evaluation prior to donation of a kidney to an unrelated person    Clinical status of patient:  Outpatient    Met with:  Patient    History of present illness: Mr. Minesh Eisenberg is a 69-year-old white  male referred for Psychological Evaluation prior to donating a kidney to an unrelated person. He would like to donate a kidney to George Oh who is his best friend. Mr. Eisenberg went to high school with Mr. Oh and his wife. They have stayed in contact through the years and enjoy doing many things together. Mr. Eisenberg reported Mr. Oh is a good ximena and has worked hard all his life. It was completely Mr. Martinez idea to donate. The recipient is on dialysis and Mr. Eisenberg reported he does not like to see him this way. He feels it is the right thing to do. He feels something is pushing him to donate. The patient indicated there was no coercion or offer of payment for donation.  He was aware he could change his mind at any time without negative consequences.  He understands that he cannot donate a kidney again.  He has 3 children but would possibly match only one. He added that he cant predict what will happen in the future, but his richie definitely needs a kidney now. He understands that his future insurability may be affected. He feels he is adequately insured at this time.  He is aware that there may be a psychological let down after surgery.  He indicated that there was no financial hardship.  He is retired and his wife will be able to help him after surgery. She wanted it to be clear that she supports his decision to donate. He was clearly competent to make the decision to donate.    Mr. Eisenberg has no prior psychiatric history. He denied current depression, anxiety, or other psychological difficulties. He was pleasant and cooperative in  interview.    Pain scale: noncontributory    Symptoms:  Mood: denied  Anxiety:  denied  Substance abuse: denied  Cognitive functioning: denied    Psychiatric history: none    Medical history: willing to be a kidney donor, arthritis, OA, elevated BP reading, hyperlipidemia, GERD, peripheral neuropathy, and history of SCC of the skin.    Family history of psychiatric illness:  none    Social history (marriage, employment, etc.): He is a high school graduate with some additional college coursework. 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 He retired 2 years ago.  twice,  once. 3 children. Anabaptism. Lives with wife and her mother. Enjoys horses and motorcycles.    Substance use:   Alcohol: occasional beer or whiskey - 3 beers per week   Drugs: none   Tobacco: quit smoking 1973   Caffeine: 3 cups coffee per day, one coke    Strengths and Liabilities:   Strength:  Pt is expressive/articulate.   Liability:  None identified    Mental Status Exam:  General appearance:  appears stated age, neatly dressed, well groomed  Speech:  normal rate and tone  Level of cooperation:  cooperative  Thought processes:  logical, goal-directed  Mood:  euthymic  Thought content:  no illusions, no visual hallucinations, no auditory hallucinations, no delusions, no active or passive homicidal thoughts, no active or passive suicidal ideation, no obsessions, no compulsions, no violence  Affect:  appropriate  Orientation:  oriented to person, place, and time  Memory:  Recent memory:  2 of 3 objects after brief delay.    Remote memory - intact  Attention span and concentration:  spelled HOUSE forward and backwards  Fund of general knowledge: 4 of 4 recent presidents  Abstract reasoning:  Similarities: abstract.    Proverbs: abstract.  Judgment and insight: fair  Language:  intact    Diagnostic impressions:  Z01.818 Pre-op exam  Z52.4 Kidney donor    Plan:  Pt will complete Psychological testing.  Report of Psychological Evaluation  will follow in the Notes folder in the patients chart in the encounter titled Psychological Testing.    Time: 45 minutes

## 2019-08-17 NOTE — PSYCH TESTING
[facility]  [address] [place]  [address]  [phone]    REPORT OF PSYCHOLOGICAL EVALUATION    NAME:  Minesh Eisenberg  OC #:  [id number]  :  1950    REFERRED BY: Mercedez Ba N.P.    EVALUATED BY:  Azul Mccarthy, Ph.D., Clinical Psychologist  Yovana Britt, Psychometrician    DATES OF EVALUATION:  & 2019    EVALUATION PROCEDURES AND TIMES:  Conducted by Psychologist:  Integration of patient data, interpretation of standardized test results and clinical data, clinical decision making, treatment planning and report, and feedback to patient  Conducted by Technician:  Test administration and scoring of the following tests:  [place] Multiphasic Personality Inventory - 2 - Restructured Form (MMPI-2-RF)  [place] Clinical Multiaxial Inventory - III (MCMI-III)  [place] Depression Inventory - II (BDI-II)  [place] Anxiety Inventory (BRYANT)  CPT Codes:  81497 - 1 unit; +31429 - 1 unit; 15721 - 1 unit; +22150 - 2 units  Time:  Psychologist - 2 hours; Technician - 1 hour 27 minutes    EVALUATION FINDINGS:  The diagnostic interview revealed that Mr. Minesh Eisenberg is a 69-year-old white  male referred for Psychological Evaluation prior to donating a kidney to an unrelated person. He would like to donate a kidney to George Oh who is his best friend. Mr. Eisenberg went to high school with Mr. Oh and his wife. They have stayed in contact through the years and enjoy doing many things together. Mr. Eisenberg reported Mr. Oh is a good ximena and has worked hard all his life. It was completely Mr. Martinez idea to donate. The recipient is on dialysis and Mr. Eisenberg reported he does not like to see him this way. He feels it is the right thing to do. He feels something is pushing him to donate. The patient indicated there was no coercion or offer of payment for donation.  He was aware he could change his mind at any time without negative consequences.  He understands that he cannot donate a kidney  again.  He has 3 children but would possibly match only one. He added that he cant predict what will happen in the future, but his richie definitely needs a kidney now. He understands that his future insurability may be affected. He feels he is adequately insured at this time.  He is aware that there may be a psychological let down after surgery.  He indicated that there was no financial hardship.  He is retired and his wife will be able to help him after surgery. She wanted it to be clear that she supports his decision to donate. He was clearly competent to make the decision to donate.    Mr. Eisenberg has no prior psychiatric history. He denied current depression, anxiety, or other psychological difficulties. He was pleasant and cooperative in interview. The Mental Status Exam suggested reduced recent memory, but he denied any difficulty with memory in his day to day life.    The medical record also revealed medical history of willing to be a kidney donor, arthritis, OA, elevated BP reading, hyperlipidemia, GERD, peripheral neuropathy, and history of SCC of the skin.    TEST DATA:  Psychological Testing data revealed that he was fully cooperative and engaged in the assessment process. He is a high school graduate with some additional college coursework. 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 He retired 2 years ago. He was alert and cooperative during the evaluation.  Effort on all tests was satisfactory to produce valid results.    The MMPI-2-RF profile obtained was valid. There were no indications of somatic or cognitive complaints, or of emotional, thought, behavioral, or interpersonal dysfunction. The profile was within normal limits and suggested no psychodiagnostic considerations. There was no suggestion of anxiety, depression, substance abuse, or a thought disorder.    The MCMI-III findings suggested he is a well-functioning adult who is facing just minor stressors. Test data suggested he is concerned with  public appearances, that is, with being seen by others as composed, virtuous, and conventional in her behavior. He likely attempts to downplay any distressing emotions and tries to deny troublesome relationships with others. The demand of the testing situation may have contributed to this approach to testing. There were no indications of an Axis I disorder such as depression or anxiety.     The BDI-II revealed the presence of minimal depression with a total score of 7.    The BRYANT revealed the presence of minimal anxiety with a total score of 3.    DIAGNOSTIC IMPRESSIONS:  Z01.818 Pre-op exam  Z52.4 Kidney donor    SUMMARY AND RECOMMENDATIONS:  Mr. Eisenberg has no psychiatric history. He was appropriate, pleasant, and cooperative in interview and appeared to be in good spirits. Test data were within normal limits with no suggestion of anxiety, depression, substance abuse, or a thought disorder. This evaluation revealed no contraindications to kidney donation from the psychological perspective. No recommendations are forthcoming.        Interpretation and report and coding were completed on 8/17/2019.

## 2019-08-19 ENCOUNTER — TELEPHONE (OUTPATIENT)
Dept: TRANSPLANT | Facility: CLINIC | Age: 69
End: 2019-08-19

## 2019-09-03 ENCOUNTER — TELEPHONE (OUTPATIENT)
Dept: TRANSPLANT | Facility: CLINIC | Age: 69
End: 2019-09-03

## 2019-09-03 NOTE — TELEPHONE ENCOUNTER
Spoke with Sanam. We will have CD of CT scan mailed to home address on file ASAP for local urology review.       ----- Message from Kristi Buck sent at 9/3/2019  1:31 PM CDT -----  Contact: Sanam / Wife  [phone]  PT is requesting CT results be mailed to the address on file. His doctor in [place] is requesting the disc.